# Patient Record
Sex: MALE | Race: WHITE | NOT HISPANIC OR LATINO | URBAN - METROPOLITAN AREA
[De-identification: names, ages, dates, MRNs, and addresses within clinical notes are randomized per-mention and may not be internally consistent; named-entity substitution may affect disease eponyms.]

---

## 2023-03-30 ENCOUNTER — TELEPHONE (OUTPATIENT)
Dept: CARDIOLOGY | Facility: MEDICAL CENTER | Age: 33
End: 2023-03-30

## 2023-03-30 NOTE — TELEPHONE ENCOUNTER
Chart Prep    Called patient in regards to records for NP appointment with Dr. BACON To review most recent lab results, ekg, any cardiac testing or ,if he has been treated by a cardiologist. No answer, left voicemail to call back

## 2023-04-21 ENCOUNTER — OFFICE VISIT (OUTPATIENT)
Dept: CARDIOLOGY | Facility: MEDICAL CENTER | Age: 33
End: 2023-04-21
Payer: MEDICAID

## 2023-04-21 ENCOUNTER — TELEPHONE (OUTPATIENT)
Dept: CARDIOLOGY | Facility: MEDICAL CENTER | Age: 33
End: 2023-04-21

## 2023-04-21 VITALS
RESPIRATION RATE: 16 BRPM | HEIGHT: 64 IN | SYSTOLIC BLOOD PRESSURE: 118 MMHG | DIASTOLIC BLOOD PRESSURE: 72 MMHG | WEIGHT: 190 LBS | BODY MASS INDEX: 32.44 KG/M2 | OXYGEN SATURATION: 98 % | HEART RATE: 83 BPM

## 2023-04-21 DIAGNOSIS — F15.11 METHAMPHETAMINE ABUSE IN REMISSION (HCC): ICD-10-CM

## 2023-04-21 DIAGNOSIS — F17.200 CURRENT SMOKER: ICD-10-CM

## 2023-04-21 DIAGNOSIS — R07.89 OTHER CHEST PAIN: ICD-10-CM

## 2023-04-21 DIAGNOSIS — Z71.1 CONCERN ABOUT HEART ATTACK WITHOUT DIAGNOSIS: Primary | ICD-10-CM

## 2023-04-21 PROBLEM — I25.2 OLD MI (MYOCARDIAL INFARCTION): Status: ACTIVE | Noted: 2023-04-21

## 2023-04-21 LAB — EKG IMPRESSION: NORMAL

## 2023-04-21 PROCEDURE — 99406 BEHAV CHNG SMOKING 3-10 MIN: CPT | Performed by: INTERNAL MEDICINE

## 2023-04-21 PROCEDURE — 93000 ELECTROCARDIOGRAM COMPLETE: CPT | Performed by: INTERNAL MEDICINE

## 2023-04-21 PROCEDURE — 99203 OFFICE O/P NEW LOW 30 MIN: CPT | Mod: 25 | Performed by: INTERNAL MEDICINE

## 2023-04-21 RX ORDER — ASPIRIN 81 MG/1
81 TABLET, CHEWABLE ORAL DAILY
Qty: 100 TABLET | Refills: 1 | Status: SHIPPED | OUTPATIENT
Start: 2023-04-21 | End: 2023-06-26

## 2023-04-21 RX ORDER — ASPIRIN 81 MG/1
81 TABLET, CHEWABLE ORAL DAILY
COMMUNITY
End: 2023-04-21 | Stop reason: SDUPTHER

## 2023-04-21 RX ORDER — KETOROLAC TROMETHAMINE 30 MG/ML
INJECTION, SOLUTION INTRAMUSCULAR; INTRAVENOUS
COMMUNITY
Start: 2023-03-24 | End: 2023-04-21

## 2023-04-21 RX ORDER — ATORVASTATIN CALCIUM 40 MG/1
40 TABLET, FILM COATED ORAL NIGHTLY
Qty: 100 TABLET | Refills: 3 | Status: SHIPPED | OUTPATIENT
Start: 2023-04-21 | End: 2023-06-26

## 2023-04-21 RX ORDER — CLOPIDOGREL BISULFATE 75 MG/1
75 TABLET ORAL DAILY
Qty: 90 TABLET | Refills: 1 | Status: SHIPPED | OUTPATIENT
Start: 2023-04-21 | End: 2023-06-26

## 2023-04-21 NOTE — PROGRESS NOTES
CARDIOLOGY NEW PATIENT:    PCP: No primary care provider on file.    1. Concern about heart attack without diagnosis    2. Current smoker    3. Methamphetamine abuse in remission (HCC)    4. Other chest pain        Timmy Perez here for possible recent MI.    Chief Complaint   Patient presents with    Old MI (> 6 Months)       History: Timmy Perez is a 33 y.o. male with no known medical issues, prior meth, current smoker, is here after a reported MI in Jan 2023 while at [facility] in [place] ([facility]). Advised to take atorva, Plavix and ASA which he has been taking almost daily (he occasionally forgets). He was in intermediate for 2 weeks in February. Used meth within the 3 days of his CP in January. Last used meth in jan 2023.    Had CP leading up to Jan 19th for 3 days, and then he felt weak with worsening CP, felt clammy and dizzy and flown to [facility]. Reportedly he was offered a CA/LHC on day 3 of his stay (not on arrival) but he refused since he was not sure why and he was upset from being unrested there with several blood draws.    For last 5 years noticed on an off episodes of CP. Last week he noticed recurrence of his left sided CP with his left arm tense sensation. Mostly at rest, not worse with exertion. Not associated with any other symptoms.    Has occasional THOMAS. Denies SOB, GILBERT, orthopnea, PND, palpitations, dizziness, LH.    No prior records available for my review from Jan 2023 and not in care everywhere.    No exercise routine.    Due to being in active, gained weight lately.    Never had COVID, not vaccinated against.    Social:  No alcohol use  Smoked tobacco 1ppd since high school  Prior meth abuse, quit in feb 2023  Marijuana use but not now  Lives with mother  Unemployed, used to be a   Single, no children    Cardiovascular Risk Factors:  1. Smoking status: 1 ppd  2. Type II Diabetes Mellitus: No  3. Hypertension: No  4. Dyslipidemia: Not known  5. Obesity / metabolic  "syndrome: Yes, BMI 32.6  6. Family history of ASCVD: No  7. Family history of premature ASCVD in a first degree relative (Male less than 55 years of age; Female less than 60 years of age): No  8. Sedentary lifestyle: Yes  9. Lack of exercise: Yes    Functional status:    MMRC stGstrstastdstest:st st1st 0: Breathless only during strenuous exercise  1: Short of breath when hurrying or going up a small hill  2: Walks slower than friends due to breathlessness, has to stop at own pace  3: Stops to catch breath on level ground after 100m  4: Breathless with ambulating around house or ADLs         PE:  /72 (BP Location: Left arm, Patient Position: Sitting, BP Cuff Size: Adult)   Pulse 83   Resp 16   Ht 1.626 m (5' 4\")   Wt 86.2 kg (190 lb)   SpO2 98%   BMI 32.61 kg/m²     Gen: well  HEENT: Symmetric face. Anicteric sclerae. Moist mucus membranes  NECK: No JVD.   CARDIAC: Regular, Normal S1, S2, No murmur  VASCULATURE: carotids are normal bilaterally without bruit  RESP: Clear to auscultation bilaterally   ABD: Soft, non-tender, non-distended  EXT: No edema, no clubbing or cyanosis  SKIN: Warm and dry  NEURO: No gross deficits  PSYCH: Appropriate affect, participates in conversation    The ASCVD Risk score (Hackensack DK, et al., 2019) failed to calculate.    History reviewed. No pertinent past medical history.  History reviewed. No pertinent surgical history.  Not on File  Outpatient Encounter Medications as of 4/21/2023   Medication Sig Dispense Refill    clopidogrel (PLAVIX) 75 MG Tab Take 1 Tablet by mouth every day. 90 Tablet 1    atorvastatin (LIPITOR) 40 MG Tab Take 1 Tablet by mouth every evening. 100 Tablet 3    aspirin (ASA) 81 MG Chew Tab chewable tablet Chew 1 Tablet every day. 100 Tablet 1    [DISCONTINUED] ketorolac (TORADOL) 30 MG/ML Solution       [DISCONTINUED] aspirin (ASA) 81 MG Chew Tab chewable tablet Chew 81 mg every day.       No facility-administered encounter medications on file as of 4/21/2023.     Social " History     Socioeconomic History    Marital status: Single     Spouse name: Not on file    Number of children: Not on file    Years of education: Not on file    Highest education level: Not on file   Occupational History    Not on file   Tobacco Use    Smoking status: Every Day     Packs/day: 0.50     Types: Cigarettes    Smokeless tobacco: Never   Substance and Sexual Activity    Alcohol use: Not Currently    Drug use: Not Currently    Sexual activity: Not on file   Other Topics Concern    Not on file   Social History Narrative    Not on file     Social Determinants of Health     Financial Resource Strain: Not on file   Food Insecurity: Not on file   Transportation Needs: Not on file   Physical Activity: Not on file   Stress: Not on file   Social Connections: Not on file   Intimate Partner Violence: Not on file   Housing Stability: Not on file     Family History   Problem Relation Age of Onset    Heart Disease Neg Hx          Studies    No results found for: TSHULTRASEN   No results found for: FREET4   No results found for: HBA1C  No results found for: CHOLSTRLTOT, LDL, HDL, TRIGLYCERIDE    No results found for: SODIUM, POTASSIUM, CHLORIDE, CO2, GLUCOSE, BUN, CREATININE, BUNCREATRAT, GLOMRATE  No results found for: ALKPHOSPHAT, ASTSGOT, ALTSGPT, TBILIRUBIN     Echocardiogram:  No results found for this or any previous visit.    EC23 personally reviewed and interpreted  NSR    Assessment and Recommendations:    Problem List Items Addressed This Visit       Methamphetamine abuse in remission (HCC)    Current smoker    Old MI (myocardial infarction) - Primary    Relevant Medications    clopidogrel (PLAVIX) 75 MG Tab    atorvastatin (LIPITOR) 40 MG Tab    aspirin (ASA) 81 MG Chew Tab chewable tablet     Other Visit Diagnoses       Other chest pain              Unfortunately, I do not have any records of his reported MI from 2023.  We will try to obtain records from [facility] and [facility] before  any further recommendations.  I refilled his aspirin, Plavix and atorvastatin until I reviewed his records.    It is less likely that he had an MI related to obstructive CAD given his young age, and more likely his presentation was related to methamphetamine abuse at that time.  Fortunately he is no longer using meth.  Did not seem from history, that this presentation was related to myopericarditis.    Tobacco cessation counseling and education provided for 4 minutes. Nicotine replacement options provided including patch, over the counter lozenges or gum, medications such as Wellbutrin and Chantix .  He is not ready yet to quit.  I offered him my assistance in the process once he is ready and to let me know.    Thank you for the opportunity to be involved in Timmy Perez 's care; and please reach out with any questions or concerns.    Return if symptoms worsen or fail to improve.  Follow-up will be arranged according to his record review from his January 2023 event.    Cristhian Spencer MD, MPH [facility]  Interventional Cardiologist  [facility]   of Clinical Internal Medicine - [facility]    ~ Portions of this note were completed using voice recognition software (Dragon Naturally speaking software) . Occasional transcription errors may have escaped proof reading. I have made every reasonable attempt to correct obvious errors, but I expect that there are errors of grammar and possibly content that I did not discover before finalizing the note. ~

## 2023-04-21 NOTE — TELEPHONE ENCOUNTER
Requested records from;    [facility]:  F#: [phone]  P#:[phone]    [facility]  F#: [phone]  P#: [phone]    Confirmation reports received and scanned into [employer]. Pending records.

## 2023-04-28 ENCOUNTER — OFFICE VISIT (OUTPATIENT)
Dept: URGENT CARE | Facility: PHYSICIAN GROUP | Age: 33
End: 2023-04-28
Payer: MEDICAID

## 2023-04-28 VITALS
BODY MASS INDEX: 32.61 KG/M2 | HEIGHT: 64 IN | RESPIRATION RATE: 14 BRPM | DIASTOLIC BLOOD PRESSURE: 72 MMHG | SYSTOLIC BLOOD PRESSURE: 128 MMHG | WEIGHT: 191 LBS | HEART RATE: 94 BPM | TEMPERATURE: 97.9 F | OXYGEN SATURATION: 98 %

## 2023-04-28 DIAGNOSIS — S82.302A CLOSED FRACTURE OF DISTAL END OF LEFT TIBIA, UNSPECIFIED FRACTURE MORPHOLOGY, INITIAL ENCOUNTER: ICD-10-CM

## 2023-04-28 PROCEDURE — 99213 OFFICE O/P EST LOW 20 MIN: CPT | Performed by: NURSE PRACTITIONER

## 2023-04-28 RX ORDER — IBUPROFEN 600 MG/1
TABLET ORAL
COMMUNITY
Start: 2023-04-24

## 2023-04-28 ASSESSMENT — ENCOUNTER SYMPTOMS
GASTROINTESTINAL NEGATIVE: 1
FEVER: 0
RESPIRATORY NEGATIVE: 1
CHILLS: 0
CARDIOVASCULAR NEGATIVE: 1
EYES NEGATIVE: 1
CONSTITUTIONAL NEGATIVE: 1

## 2023-04-29 NOTE — PROGRESS NOTES
"Subjective:   Timmy Perez is a 33 y.o. male who presents for Leg Injury (Pt fractured Tibia, was seen at [facility] needs referral to ortho, they would not take [facility] referral, )      Patient presents for referral to orthopedics today, status post tibia fracture sustained on 4/24/2023.  Patient reports that he was getting out of his truck and stepped wrong on the ground, and fractured his tibia.  He was seen at [facility], where x-rays were performed and he was diagnosed.  We do not have records of this, so it is unclear exactly where the fracture of his tibia is.  [facility] did refer him to orthopedics, but he tried to get in and was told he had to have a referral from [facility].  He was initially placed in a splint, but took it off because it became dirty and he wanted to shower.  He states that he can walk on it slightly with a limp but does not have full range of motion of his ankle without pain.  Patient denies significant pain.  He denies numbness and tingling.      Review of Systems   Constitutional: Negative.  Negative for chills and fever.   HENT: Negative.     Eyes: Negative.    Respiratory: Negative.     Cardiovascular: Negative.    Gastrointestinal: Negative.    Genitourinary: Negative.    Musculoskeletal:  Positive for joint pain.        Left tibia fracture   Skin: Negative.      Medications, Allergies, and current problem list reviewed today in Epic.     Objective:     /72   Pulse 94   Temp 36.6 °C (97.9 °F) (Temporal)   Resp 14   Ht 1.626 m (5' 4\")   Wt 86.6 kg (191 lb)   SpO2 98%     Physical Exam  Vitals reviewed.   Constitutional:       Appearance: Normal appearance.   HENT:      Head: Normocephalic and atraumatic.      Nose: Nose normal.      Mouth/Throat:      Mouth: Mucous membranes are moist.      Pharynx: Oropharynx is clear.   Eyes:      Extraocular Movements: Extraocular movements intact.      Conjunctiva/sclera: Conjunctivae normal.      Pupils: Pupils are equal, round, and reactive " to light.   Cardiovascular:      Rate and Rhythm: Normal rate and regular rhythm.      Pulses: Normal pulses.      Heart sounds: Normal heart sounds.   Pulmonary:      Effort: Pulmonary effort is normal.      Breath sounds: Normal breath sounds.   Abdominal:      General: Abdomen is flat. Bowel sounds are normal.      Palpations: Abdomen is soft.   Musculoskeletal:         General: Normal range of motion.      Cervical back: Normal range of motion and neck supple.      Left lower leg: Swelling, tenderness and bony tenderness present. 2+ Edema present.   Skin:     General: Skin is warm and dry.      Capillary Refill: Capillary refill takes less than 2 seconds.   Neurological:      General: No focal deficit present.      Mental Status: He is alert and oriented to person, place, and time.   Psychiatric:         Mood and Affect: Mood normal.         Behavior: Behavior normal.       Assessment/Plan:     Diagnosis and associated orders:     1. Closed fracture of distal end of left tibia, unspecified fracture morphology, initial encounter  Referral to Orthopedics         Comments/MDM:     Urgent referral to orthopedics was placed for patient today.  He plans to go to [facility] walk-in clinic in [place], and this was requested on his referral.  Patient will remain nonweightbearing until seen by specialty.  Patient will follow-up here for any further needs.         Differential diagnosis, natural history, supportive care, and indications for immediate follow-up discussed.    Advised the patient to follow-up with the primary care physician for recheck, reevaluation, and consideration of further management.    Please note that this dictation was created using voice recognition software. I have made a reasonable attempt to correct obvious errors, but I expect that there are errors of grammar and possibly content that I did not discover before finalizing the note.    This note was electronically signed by PATRIC Honeycutt

## 2023-05-31 ENCOUNTER — OFFICE VISIT (OUTPATIENT)
Dept: URGENT CARE | Facility: PHYSICIAN GROUP | Age: 33
End: 2023-05-31
Payer: MEDICAID

## 2023-05-31 VITALS
RESPIRATION RATE: 14 BRPM | BODY MASS INDEX: 35.17 KG/M2 | HEIGHT: 64 IN | DIASTOLIC BLOOD PRESSURE: 78 MMHG | TEMPERATURE: 97.5 F | HEART RATE: 84 BPM | WEIGHT: 206 LBS | OXYGEN SATURATION: 98 % | SYSTOLIC BLOOD PRESSURE: 118 MMHG

## 2023-05-31 DIAGNOSIS — L25.9 CONTACT DERMATITIS, UNSPECIFIED CONTACT DERMATITIS TYPE, UNSPECIFIED TRIGGER: ICD-10-CM

## 2023-05-31 PROCEDURE — 3074F SYST BP LT 130 MM HG: CPT | Performed by: STUDENT IN AN ORGANIZED HEALTH CARE EDUCATION/TRAINING PROGRAM

## 2023-05-31 PROCEDURE — 3078F DIAST BP <80 MM HG: CPT | Performed by: STUDENT IN AN ORGANIZED HEALTH CARE EDUCATION/TRAINING PROGRAM

## 2023-05-31 PROCEDURE — 99213 OFFICE O/P EST LOW 20 MIN: CPT | Performed by: STUDENT IN AN ORGANIZED HEALTH CARE EDUCATION/TRAINING PROGRAM

## 2023-05-31 RX ORDER — TRIAMCINOLONE ACETONIDE 0.25 MG/G
1 CREAM TOPICAL 2 TIMES DAILY
Qty: 15 G | Refills: 0 | Status: SHIPPED | OUTPATIENT
Start: 2023-05-31 | End: 2023-06-07

## 2023-05-31 RX ORDER — FAMOTIDINE 20 MG/1
TABLET, FILM COATED ORAL
COMMUNITY
Start: 2023-05-23

## 2023-05-31 RX ORDER — CETIRIZINE HYDROCHLORIDE 10 MG/1
10 TABLET ORAL DAILY
Qty: 15 TABLET | Refills: 0 | Status: SHIPPED | OUTPATIENT
Start: 2023-05-31

## 2023-05-31 ASSESSMENT — ENCOUNTER SYMPTOMS
SHORTNESS OF BREATH: 0
COUGH: 0
FATIGUE: 0
SORE THROAT: 0
FEVER: 0

## 2023-05-31 NOTE — PROGRESS NOTES
"Subjective     Timmy Perez is a 33 y.o. male who presents with Blister (X3 days ago noticed blisters all over hands, pt popped some )            Timmy is a 33 y.o. male who presents with rash on bilateral hands.  Patient states he noticed rash approximately 3 days ago. Patient states he recently started new job as a . Unsure if he came in contact with anything that caused the rash. States he borrowed someone's gloves at work and wears them sometimes. Staees rash is itchy. He did notice one blister that popped when he scratched it.    Rash  This is a new problem. Episode onset: 3 days ago. The affected locations include the right hand and left hand. The rash is characterized by redness, itchiness and blistering. It is unknown if there was an exposure to a precipitant. Pertinent negatives include no congestion, cough, fatigue, fever, shortness of breath or sore throat. Past treatments include nothing.       Review of Systems   Constitutional:  Negative for fatigue and fever.   HENT:  Negative for congestion and sore throat.    Respiratory:  Negative for cough and shortness of breath.    Skin:  Positive for rash.              Objective     /78   Pulse 84   Temp 36.4 °C (97.5 °F) (Temporal)   Resp 14   Ht 1.626 m (5' 4\")   Wt 93.4 kg (206 lb)   SpO2 98%   BMI 35.36 kg/m²      Physical Exam  Vitals reviewed.   Constitutional:       Appearance: Normal appearance.   HENT:      Head: Normocephalic and atraumatic.   Eyes:      Extraocular Movements: Extraocular movements intact.      Conjunctiva/sclera: Conjunctivae normal.      Pupils: Pupils are equal, round, and reactive to light.   Cardiovascular:      Rate and Rhythm: Normal rate.   Pulmonary:      Effort: Pulmonary effort is normal.   Musculoskeletal:         General: Normal range of motion.      Cervical back: Normal range of motion.   Skin:     General: Skin is warm and dry.      Capillary Refill: Capillary refill takes less than 2 " seconds.      Findings: Rash present. Rash is macular, papular and urticarial.          Neurological:      General: No focal deficit present.      Mental Status: He is alert. Mental status is at baseline.                             Assessment & Plan        1. Contact dermatitis, unspecified contact dermatitis type, unspecified trigger  - cetirizine (ZYRTEC ALLERGY) 10 MG Tab; Take 1 Tablet by mouth every day.  Dispense: 15 Tablet; Refill: 0  - triamcinolone acetonide (KENALOG) 0.025 % Cream; Apply 1 Application. topically 2 times a day for 7 days.  Dispense: 15 g; Refill: 0     Differential diagnoses, supportive care, and indications for immediate follow-up discussed with patient. Pathogenesis of diagnosis discussed including typical length and natural progression.  See AVS.    Instructed to return to urgent care or nearest emergency department if symptoms fail to improve, for any change in condition, further concerns, or new concerning symptoms.    Patient states understanding and agrees with the plan of care and discharge instructions.

## 2023-06-26 ENCOUNTER — TELEPHONE (OUTPATIENT)
Dept: CARDIOLOGY | Facility: MEDICAL CENTER | Age: 33
End: 2023-06-26
Payer: MEDICAID

## 2023-06-26 NOTE — TELEPHONE ENCOUNTER
I reviewed the records from [facility] and [facility] under [facility]. In 1/2023 he presented with CP, no cath done (patient refused), echo normal, elevated troponin; attributed to coronary spasm at that time probably meth induced.    I was unable to get a hold of him over the phone, hence I called his mom and advised her to let him know to stop: aspirin, Plavix and atorvastatin, and to call us at anytime with any questions or concerns.  Medication discontinued.    She was appreciative of the call.

## 2024-04-02 ENCOUNTER — OFFICE VISIT (OUTPATIENT)
Dept: URGENT CARE | Facility: PHYSICIAN GROUP | Age: 34
End: 2024-04-02
Payer: MEDICAID

## 2024-04-02 VITALS
OXYGEN SATURATION: 96 % | SYSTOLIC BLOOD PRESSURE: 118 MMHG | TEMPERATURE: 98.1 F | WEIGHT: 234 LBS | HEIGHT: 64 IN | HEART RATE: 85 BPM | RESPIRATION RATE: 16 BRPM | BODY MASS INDEX: 39.95 KG/M2 | DIASTOLIC BLOOD PRESSURE: 80 MMHG

## 2024-04-02 DIAGNOSIS — K04.7 DENTAL INFECTION: ICD-10-CM

## 2024-04-02 DIAGNOSIS — K05.6 PERIODONTAL DISEASE: ICD-10-CM

## 2024-04-02 DIAGNOSIS — F17.210 TOBACCO DEPENDENCE DUE TO CIGARETTES: ICD-10-CM

## 2024-04-02 PROCEDURE — 99213 OFFICE O/P EST LOW 20 MIN: CPT | Performed by: NURSE PRACTITIONER

## 2024-04-02 PROCEDURE — 3074F SYST BP LT 130 MM HG: CPT | Performed by: NURSE PRACTITIONER

## 2024-04-02 PROCEDURE — 1125F AMNT PAIN NOTED PAIN PRSNT: CPT | Performed by: NURSE PRACTITIONER

## 2024-04-02 PROCEDURE — 3079F DIAST BP 80-89 MM HG: CPT | Performed by: NURSE PRACTITIONER

## 2024-04-02 RX ORDER — CHLORHEXIDINE GLUCONATE ORAL RINSE 1.2 MG/ML
15 SOLUTION DENTAL 2 TIMES DAILY
Qty: 118 ML | Refills: 0 | Status: SHIPPED | OUTPATIENT
Start: 2024-04-02

## 2024-04-02 RX ORDER — IBUPROFEN 600 MG/1
TABLET ORAL
Qty: 60 TABLET | Refills: 0 | Status: SHIPPED | OUTPATIENT
Start: 2024-04-02

## 2024-04-02 RX ORDER — AMOXICILLIN AND CLAVULANATE POTASSIUM 875; 125 MG/1; MG/1
1 TABLET, FILM COATED ORAL 2 TIMES DAILY
Qty: 14 TABLET | Refills: 0 | Status: SHIPPED | OUTPATIENT
Start: 2024-04-02 | End: 2024-04-09

## 2024-04-02 ASSESSMENT — PAIN SCALES - GENERAL: PAINLEVEL: 10=SEVERE PAIN

## 2024-04-02 NOTE — PROGRESS NOTES
"Subjective:   Timmy Perez is a 34 y.o. male who presents for Dental Pain (Two days dental pain. Left top teeth)      Patient is a 32-year-old male presents clinic today reporting 2-day history of worsening upper left dental pain secondary to worn down and broken teeth.  Patient ports long history of severe.  Dental disease with multiple decaying and broken teeth.  Patient does currently smoke cigarettes.  He has not had any fevers, chills, discharge or drainage from tooth, headaches, or dizziness.        Medications, Allergies, and current problem list reviewed today in Epic.     Objective:     /80   Pulse 85   Temp 36.7 °C (98.1 °F) (Temporal)   Resp 16   Ht 1.626 m (5' 4\")   Wt 106 kg (234 lb)   SpO2 96%     Physical Exam  Vitals reviewed.   Constitutional:       General: He is not in acute distress.     Appearance: Normal appearance. He is not ill-appearing or toxic-appearing.   HENT:      Head: Normocephalic.      Nose: Nose normal.      Mouth/Throat:      Lips: Pink. No lesions.      Mouth: Mucous membranes are moist.      Dentition: Abnormal dentition. Dental tenderness, gingival swelling and dental caries present. No dental abscesses or gum lesions.      Comments: Peridental disease throughout oral cavity.  Severe dental decay and multiple broken and decayed teeth throughout.  Negative for signs of dental abscess.  Eyes:      Extraocular Movements: Extraocular movements intact.      Conjunctiva/sclera: Conjunctivae normal.      Pupils: Pupils are equal, round, and reactive to light.   Cardiovascular:      Rate and Rhythm: Normal rate and regular rhythm.   Pulmonary:      Effort: Pulmonary effort is normal.   Musculoskeletal:         General: Normal range of motion.      Cervical back: Normal range of motion and neck supple.   Lymphadenopathy:      Cervical: No cervical adenopathy.   Skin:     General: Skin is warm and dry.   Neurological:      General: No focal deficit present.      Mental " Status: He is alert and oriented to person, place, and time.   Psychiatric:         Mood and Affect: Mood normal.         Behavior: Behavior normal.         Assessment/Plan:     Diagnosis and associated orders:     1. Dental infection  ibuprofen (MOTRIN) 600 MG Tab    amoxicillin-clavulanate (AUGMENTIN) 875-125 MG Tab    chlorhexidine (PERIDEX) 0.12 % Solution      2. Periodontal disease        3. Tobacco dependence due to cigarettes           Comments/MDM:     This acute condition secondary to chronic peritonsillar disease.  Patient was started on Augmentin, ibuprofen, and chlorhexidine mouthwash.  Side effects medication were discussed at length.  Discussed with patient to not take any other NSAIDs while taking ibuprofen and always take it with food.  May take additional Tylenol if needed.  Ice  Drink plenty of fluids  Follow-up with dentist as soon as possible  Return to clinic or go to the ED if symptoms worsen or fail to improve, or if the patient should develop worsening/increasing dental pain, facial swelling, redness or warmth to the affected area, difficulty swallowing, shortness of breath, fever/chills, and/or any concerning symptoms.    Patient was involved with shared decision-making throughout the exam today and verbalizes understanding regards to plan of care, discharge instructions, and follow-up         Differential diagnosis, natural history, supportive care, and indications for immediate follow-up discussed.    Advised the patient to follow-up with the primary care physician for recheck, reevaluation, and consideration of further management.    I personally reviewed prior external notes and test results pertinent to today's visit as well as additional imaging and testing completed in clinic today.     Please note that this dictation was created using voice recognition software. I have made a reasonable attempt to correct obvious errors, but I expect that there are errors of grammar and possibly  content that I did not discover before finalizing the note.

## 2024-05-09 ENCOUNTER — OFFICE VISIT (OUTPATIENT)
Dept: INTERNAL MEDICINE | Facility: OTHER | Age: 34
End: 2024-05-09
Payer: MEDICAID

## 2024-05-09 VITALS
HEART RATE: 81 BPM | WEIGHT: 229 LBS | OXYGEN SATURATION: 97 % | SYSTOLIC BLOOD PRESSURE: 117 MMHG | TEMPERATURE: 97.4 F | HEIGHT: 64 IN | BODY MASS INDEX: 39.09 KG/M2 | DIASTOLIC BLOOD PRESSURE: 77 MMHG

## 2024-05-09 DIAGNOSIS — I25.2 HISTORY OF NON-ST ELEVATION MYOCARDIAL INFARCTION (NSTEMI): ICD-10-CM

## 2024-05-09 DIAGNOSIS — Z11.59 NEED FOR HEPATITIS C SCREENING TEST: ICD-10-CM

## 2024-05-09 DIAGNOSIS — F90.9 ATTENTION DEFICIT HYPERACTIVITY DISORDER (ADHD), UNSPECIFIED ADHD TYPE: ICD-10-CM

## 2024-05-09 DIAGNOSIS — K21.9 GASTROESOPHAGEAL REFLUX DISEASE, UNSPECIFIED WHETHER ESOPHAGITIS PRESENT: ICD-10-CM

## 2024-05-09 DIAGNOSIS — E66.9 OBESITY (BMI 35.0-39.9 WITHOUT COMORBIDITY): ICD-10-CM

## 2024-05-09 DIAGNOSIS — F15.91 HISTORY OF METHAMPHETAMINE USE: ICD-10-CM

## 2024-05-09 DIAGNOSIS — Z23 NEED FOR VACCINATION: ICD-10-CM

## 2024-05-09 DIAGNOSIS — F41.1 GAD (GENERALIZED ANXIETY DISORDER): ICD-10-CM

## 2024-05-09 DIAGNOSIS — Z11.4 SCREENING FOR HIV (HUMAN IMMUNODEFICIENCY VIRUS): ICD-10-CM

## 2024-05-09 DIAGNOSIS — Z72.0 TOBACCO USE: ICD-10-CM

## 2024-05-09 PROCEDURE — 99204 OFFICE O/P NEW MOD 45 MIN: CPT | Mod: 25 | Performed by: INTERNAL MEDICINE

## 2024-05-09 PROCEDURE — 90677 PCV20 VACCINE IM: CPT | Performed by: INTERNAL MEDICINE

## 2024-05-09 PROCEDURE — 90471 IMMUNIZATION ADMIN: CPT | Performed by: INTERNAL MEDICINE

## 2024-05-09 RX ORDER — PANTOPRAZOLE SODIUM 40 MG/1
40 TABLET, DELAYED RELEASE ORAL 2 TIMES DAILY
Qty: 60 TABLET | Refills: 2 | Status: SHIPPED | OUTPATIENT
Start: 2024-05-09

## 2024-05-09 RX ORDER — NICOTINE 21 MG/24HR
1 PATCH, TRANSDERMAL 24 HOURS TRANSDERMAL EVERY 24 HOURS
Qty: 21 PATCH | Refills: 1 | Status: SHIPPED | OUTPATIENT
Start: 2024-05-09

## 2024-05-09 RX ORDER — PANTOPRAZOLE SODIUM 40 MG/1
40 TABLET, DELAYED RELEASE ORAL DAILY
COMMUNITY
End: 2024-05-09 | Stop reason: SDUPTHER

## 2024-05-09 RX ORDER — BUSPIRONE HYDROCHLORIDE 10 MG/1
10 TABLET ORAL 2 TIMES DAILY
Qty: 60 TABLET | Refills: 2 | Status: SHIPPED | OUTPATIENT
Start: 2024-05-09

## 2024-05-09 RX ORDER — SUCRALFATE 1 G/1
1 TABLET ORAL
Qty: 90 TABLET | Refills: 2 | Status: SHIPPED | OUTPATIENT
Start: 2024-05-09

## 2024-05-09 ASSESSMENT — ANXIETY QUESTIONNAIRES
3. WORRYING TOO MUCH ABOUT DIFFERENT THINGS: NEARLY EVERY DAY
4. TROUBLE RELAXING: NOT AT ALL
GAD7 TOTAL SCORE: 15
5. BEING SO RESTLESS THAT IT IS HARD TO SIT STILL: NEARLY EVERY DAY
IF YOU CHECKED OFF ANY PROBLEMS ON THIS QUESTIONNAIRE, HOW DIFFICULT HAVE THESE PROBLEMS MADE IT FOR YOU TO DO YOUR WORK, TAKE CARE OF THINGS AT HOME, OR GET ALONG WITH OTHER PEOPLE: VERY DIFFICULT
6. BECOMING EASILY ANNOYED OR IRRITABLE: NEARLY EVERY DAY
7. FEELING AFRAID AS IF SOMETHING AWFUL MIGHT HAPPEN: NOT AT ALL
1. FEELING NERVOUS, ANXIOUS, OR ON EDGE: NEARLY EVERY DAY
2. NOT BEING ABLE TO STOP OR CONTROL WORRYING: NEARLY EVERY DAY

## 2024-05-09 ASSESSMENT — PATIENT HEALTH QUESTIONNAIRE - PHQ9: CLINICAL INTERPRETATION OF PHQ2 SCORE: 0

## 2024-05-09 NOTE — LETTER
May 9, 2024    To Whom It May Concern:         This is confirmation that Timmy Perez attended his scheduled appointment with Maday Cooper D.O. on 5/09/24.         If you have any questions please do not hesitate to call me at the phone number listed below.    Sincerely,          Maday Cooper D.O.  [phone]

## 2024-05-09 NOTE — PROGRESS NOTES
"CC:  New patient and GERD    HISTORY OF THE PRESENT ILLNESS: Patient is a 34 y.o. male who present as new patient. Patient reports a history of GERD, ADHD and anxiety, NSTEMI (2023) believe to be secondary to drug use.   He reports that he has been in [facility] for over a year, has been sober for 14 months.  He was receiving medications through another provider.    GERD  He reports that symptoms started over a year ago and has progressively worsen. States he has heartburn with all foods and liquids. Sensation is raw feeling. Has nausea and vomiting (morning). He denies abdominal pain, difficult swallowing, food getting suck/choking bloody or melanotic stools. He believes that his symptoms worsen after he gained weight after stopping drugs. He diet is poor, generally eats junk, fast food and take-out.  Acidic food  makes symptoms worse, he had to stop drinking coffee. Redbull energy drinks seems to help with heartburn.  TUMS does not help  He was started Pantoprazole about 1 month ago which helps with symptoms, however, pain recurs later on in the day.     ADHD  Diagnosed in childhood, he was Adderall starting in [employer] high. Stopped after high school after getting involved with drugs (methamphetamines and marijuana). He was the placed on Adderall for short time in adulthood by a provider in [place], but was lost to follow-up after getting involved in drugs again. He has been in [facility] [place] and sober for the past 14 months, states that Dr. Méndez placed him on Wellbutrin for ADHD, which did not help. Although it did help with smoking cessation.   He is wanted to get back on medications.     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 Prior to that he would medications of the streets.  He states that anxiety is uncontrolled, he is not currently on medications. He will not try any antidepressant as he has seen \"what it has done to his mother\"    Tobacco use  Has been smoking since 7th grade " (12-14 yo), 1 pack per day.  He was on Wellbutrin for ADHD, which help with smoking, but does not want to get back on medication.  He is open to quitting.      Health Maintenance:     Screening/Preventative Topics:  Cholesterol Screening: NA  Diet: Not balanced, he reports he still eat junk/fast food.  Exercise: 3 times weekly, weight-lifting  Screen for depression: 0  Substance Use: H/o methamphetamine use (IVDU once), smokes marijuana. No alcohol use    Immunizations:   Tetanus: overdue, patient declines-states it hurts too much  Pneumococcal: given today    Allergies: Patient has no known allergies.    Current Outpatient Medications Ordered in Epic   Medication Sig Dispense Refill    nicotine (NICODERM) 21 MG/24HR PATCH 24 HR Place 1 Patch on the skin every 24 hours. 21 Patch 1    pantoprazole (PROTONIX) 40 MG Tablet Delayed Response Take 1 Tablet by mouth 2 times a day. 60 Tablet 2    sucralfate (CARAFATE) 1 GM Tab Take 1 Tablet by mouth 3 times a day before meals. 90 Tablet 2    busPIRone (BUSPAR) 10 MG Tab tablet Take 1 Tablet by mouth 2 times a day. 60 Tablet 2    ibuprofen (MOTRIN) 600 MG Tab 1 TAB BY MOUTH EVERY 6 HOURS ONLY IF NEEDED FOR PAIN AND INFLAMMATION. TAKE WITH FOOD. 60 Tablet 0     No current Epic-ordered facility-administered medications on file.       Past Medical History:   Diagnosis Date    Heart attack (HCC)        History reviewed. No pertinent surgical history.    Social History     Tobacco Use    Smoking status: Every Day     Current packs/day: 0.50     Types: Cigarettes    Smokeless tobacco: Never   Substance Use Topics    Alcohol use: Not Currently    Drug use: Not Currently       Social History     Social History Narrative    Not on file       Family History   Problem Relation Age of Onset    Heart Disease Neg Hx        ROS:   Review of Systems   Constitutional:  Negative for chills, fever, malaise/fatigue and weight loss.   HENT:  Negative for congestion and sore throat.    Eyes:   "Negative for blurred vision and double vision.   Respiratory:  Negative for cough and shortness of breath.    Cardiovascular:  Negative for chest pain, palpitations and leg swelling.   Gastrointestinal:  Positive for heartburn, nausea and vomiting. Negative for abdominal pain, blood in stool, constipation, diarrhea and melena.   Genitourinary:  Negative for frequency and urgency.   Musculoskeletal:  Negative for myalgias.   Skin:  Negative for itching and rash.   Neurological:  Negative for dizziness, weakness and headaches.   Psychiatric/Behavioral:  Negative for depression. The patient is nervous/anxious.        Exam: /77 (BP Location: Left arm, Patient Position: Sitting, BP Cuff Size: Adult)   Pulse 81   Temp 36.3 °C (97.4 °F) (Temporal)   Ht 1.626 m (5' 4\")   Wt 104 kg (229 lb)   SpO2 97%  Body mass index is 39.31 kg/m².  Physical Exam  Constitutional:       General: He is not in acute distress.     Appearance: He is obese.   HENT:      Head: Normocephalic and atraumatic.      Right Ear: Tympanic membrane and external ear normal. There is no impacted cerumen.      Left Ear: Tympanic membrane and external ear normal. There is no impacted cerumen.      Mouth/Throat:      Mouth: Mucous membranes are moist.      Pharynx: Oropharynx is clear.   Eyes:      Extraocular Movements: Extraocular movements intact.      Conjunctiva/sclera: Conjunctivae normal.   Cardiovascular:      Rate and Rhythm: Normal rate and regular rhythm.      Pulses: Normal pulses.   Pulmonary:      Effort: Pulmonary effort is normal.      Breath sounds: Normal breath sounds.   Abdominal:      General: Bowel sounds are normal. There is no distension.      Palpations: Abdomen is soft.      Tenderness: There is no abdominal tenderness.   Musculoskeletal:      Cervical back: Neck supple.      Right lower leg: No edema.      Left lower leg: No edema.   Lymphadenopathy:      Cervical: No cervical adenopathy.   Skin:     General: Skin is warm " and dry.   Neurological:      General: No focal deficit present.      Mental Status: He is alert.   Psychiatric:         Mood and Affect: Mood normal.         Behavior: Behavior normal.         Thought Content: Thought content normal.         Judgment: Judgment normal.         Assessment/Plan    Gastroesophageal reflux disease, unspecified whether esophagitis present  Onset over 1 year ago   We discussed H. Pylori testing, however, he is not amenable to coming off PPI. Has tried H2 block in the past without improvement.  Will increase Pantoprazole to BID and add Carafate  Will refer to GI as patient reports symptoms will both liquids/solids, he would like to have endoscopy  - CBC WITHOUT DIFFERENTIAL; Future  - pantoprazole (PROTONIX) 40 MG Tablet Delayed Response; Take 1 Tablet by mouth 2 times a day.  Dispense: 60 Tablet; Refill: 2  - sucralfate (CARAFATE) 1 GM Tab; Take 1 Tablet by mouth 3 times a day before meals.  Dispense: 90 Tablet; Refill: 2  - Referral to Gastroenterology    History of non-ST elevation myocardial infarction (NSTEMI)  NSTEMI in 2023. Had ECHO with LVEF 55% no regional wall abnormalities. Was recommend [facility], however, patient had declined.  He was discharge home on ASA, Lipitor and Plavix. Saw Cardiology outpatient, who believe it was related to coronary spasm/meth use.  All medications discontinued  Denies any frequent recurrence pain    Attention deficit hyperactivity disorder (ADHD), unspecified ADHD type  Diagnosed in childhood, was on Adderall. Stopped after getting involved in drug use.  Has tried Wellbutrin without effect. Also, reports he was tried on a non-stimulant which was ineffective  Will refer to Psych for evaluation  - Referral to Psychiatry  - Referral to Psychology    MADELEINE (generalized anxiety disorder)  Diagnosed 5 years ago, reports he was on Xanax while in residential  We discussed treatment options to include SSRI/SNRI, patient declined as he has seen the effects on his  mother.  He is open to trying Buspar, will start 10mg BID  - Referral to Psychiatry  - Referral to Psychology  - busPIRone (BUSPAR) 10 MG Tab tablet; Take 1 Tablet by mouth 2 times a day.  Dispense: 60 Tablet; Refill: 2    Tobacco use  Tobacco cessation counseling and education provided for 7 minutes. Nicotine replacement options provided including patch, and further medical treatments including Wellbutrin and Chantix. As well as over the counter options of lozenges and gum.  He had success with Wellbutrin, but does not want to try it again.   Opened to nicotine patches, prescription provided.  - nicotine (NICODERM) 21 MG/24HR PATCH 24 HR; Place 1 Patch on the skin every 24 hours.  Dispense: 21 Patch; Refill: 1  - Lipid Profile; Future    History of methamphetamine use  Sober for 14 months, just completed [facility]  Congratulation and encouraged continuation abstinence    Obesity (BMI 35.0-39.9 without comorbidity)  Patient meets criteria for having weight management issue based on their BMI; appropriate education counseling were provided please see below for some details:  Encouraged diet high in fruits, vegetables, and fiber. And a diet low in salt and processed foods because of their cholesterol, saturated fat, and trans fatty acids content.    Saturated fats are also found in creams, cheeses, butter, mayonnaise, and fried foods.  Encouraged a minimum of 15 minutes of moderate intensity aerobic exercise (eg, brisk walking) is recommended on five days each week. Or 20 minutes of vigorous-intensity aerobic exercise (eg, jogging) on three days each week.   Patient declined nutrition referral  - HEMOGLOBIN A1C; Future  - Lipid Profile; Future  - Comp Metabolic Panel; Future  - TSH WITH REFLEX TO FT4; Future    Screening for HIV (human immunodeficiency virus)  - HIV AG/AB COMBO ASSAY SCREENING; Future    Need for hepatitis C screening test  - HEP C VIRUS ANTIBODY; Future    Need for vaccination  - Pneumococcal  Conjugate Vaccine 20-Valent (6 wks+)    Follow-up in 4 weeks.

## 2024-05-10 ASSESSMENT — ENCOUNTER SYMPTOMS
VOMITING: 1
HEARTBURN: 1
WEIGHT LOSS: 0
DIZZINESS: 0
CONSTIPATION: 0
NAUSEA: 1
CHILLS: 0
DEPRESSION: 0
SHORTNESS OF BREATH: 0
DIARRHEA: 0
MYALGIAS: 0
DOUBLE VISION: 0
SORE THROAT: 0
COUGH: 0
WEAKNESS: 0
ABDOMINAL PAIN: 0
NERVOUS/ANXIOUS: 1
HEADACHES: 0
FEVER: 0
PALPITATIONS: 0
BLURRED VISION: 0
BLOOD IN STOOL: 0

## 2024-05-13 ENCOUNTER — HOSPITAL ENCOUNTER (OUTPATIENT)
Dept: LAB | Facility: MEDICAL CENTER | Age: 34
End: 2024-05-13
Attending: INTERNAL MEDICINE
Payer: MEDICAID

## 2024-05-13 DIAGNOSIS — Z72.0 TOBACCO USE: ICD-10-CM

## 2024-05-13 DIAGNOSIS — E66.9 OBESITY (BMI 35.0-39.9 WITHOUT COMORBIDITY): ICD-10-CM

## 2024-05-13 DIAGNOSIS — Z11.4 SCREENING FOR HIV (HUMAN IMMUNODEFICIENCY VIRUS): ICD-10-CM

## 2024-05-13 DIAGNOSIS — Z11.59 NEED FOR HEPATITIS C SCREENING TEST: ICD-10-CM

## 2024-05-13 DIAGNOSIS — K21.9 GASTROESOPHAGEAL REFLUX DISEASE, UNSPECIFIED WHETHER ESOPHAGITIS PRESENT: ICD-10-CM

## 2024-05-13 LAB
ALBUMIN SERPL BCP-MCNC: 4.7 G/DL (ref 3.2–4.9)
ALBUMIN/GLOB SERPL: 1.7 G/DL
ALP SERPL-CCNC: 85 U/L (ref 30–99)
ALT SERPL-CCNC: 40 U/L (ref 2–50)
ANION GAP SERPL CALC-SCNC: 15 MMOL/L (ref 7–16)
AST SERPL-CCNC: 29 U/L (ref 12–45)
BILIRUB SERPL-MCNC: 0.4 MG/DL (ref 0.1–1.5)
BUN SERPL-MCNC: 11 MG/DL (ref 8–22)
CALCIUM ALBUM COR SERPL-MCNC: 9 MG/DL (ref 8.5–10.5)
CALCIUM SERPL-MCNC: 9.6 MG/DL (ref 8.5–10.5)
CHLORIDE SERPL-SCNC: 104 MMOL/L (ref 96–112)
CHOLEST SERPL-MCNC: 100 MG/DL (ref 100–199)
CO2 SERPL-SCNC: 21 MMOL/L (ref 20–33)
CREAT SERPL-MCNC: 1.03 MG/DL (ref 0.5–1.4)
ERYTHROCYTE [DISTWIDTH] IN BLOOD BY AUTOMATED COUNT: 37.6 FL (ref 35.9–50)
EST. AVERAGE GLUCOSE BLD GHB EST-MCNC: 103 MG/DL
GFR SERPLBLD CREATININE-BSD FMLA CKD-EPI: 99 ML/MIN/1.73 M 2
GLOBULIN SER CALC-MCNC: 2.7 G/DL (ref 1.9–3.5)
GLUCOSE SERPL-MCNC: 100 MG/DL (ref 65–99)
HBA1C MFR BLD: 5.2 % (ref 4–5.6)
HCT VFR BLD AUTO: 45.9 % (ref 42–52)
HCV AB SER QL: NORMAL
HDLC SERPL-MCNC: 34 MG/DL
HGB BLD-MCNC: 16.7 G/DL (ref 14–18)
HIV 1+2 AB+HIV1 P24 AG SERPL QL IA: NORMAL
LDLC SERPL CALC-MCNC: 54 MG/DL
MCH RBC QN AUTO: 31 PG (ref 27–33)
MCHC RBC AUTO-ENTMCNC: 36.4 G/DL (ref 32.3–36.5)
MCV RBC AUTO: 85.3 FL (ref 81.4–97.8)
PLATELET # BLD AUTO: 281 K/UL (ref 164–446)
PMV BLD AUTO: 11 FL (ref 9–12.9)
POTASSIUM SERPL-SCNC: 3.7 MMOL/L (ref 3.6–5.5)
PROT SERPL-MCNC: 7.4 G/DL (ref 6–8.2)
RBC # BLD AUTO: 5.38 M/UL (ref 4.7–6.1)
SODIUM SERPL-SCNC: 140 MMOL/L (ref 135–145)
TRIGL SERPL-MCNC: 61 MG/DL (ref 0–149)
TSH SERPL DL<=0.005 MIU/L-ACNC: 2.3 UIU/ML (ref 0.38–5.33)
WBC # BLD AUTO: 6.9 K/UL (ref 4.8–10.8)

## 2024-12-31 ENCOUNTER — OFFICE VISIT (OUTPATIENT)
Dept: INTERNAL MEDICINE | Facility: OTHER | Age: 34
End: 2024-12-31
Payer: MEDICAID

## 2024-12-31 VITALS
WEIGHT: 194 LBS | HEART RATE: 88 BPM | BODY MASS INDEX: 33.12 KG/M2 | HEIGHT: 64 IN | TEMPERATURE: 98.8 F | DIASTOLIC BLOOD PRESSURE: 77 MMHG | SYSTOLIC BLOOD PRESSURE: 115 MMHG | OXYGEN SATURATION: 99 %

## 2024-12-31 DIAGNOSIS — K21.9 GASTROESOPHAGEAL REFLUX DISEASE, UNSPECIFIED WHETHER ESOPHAGITIS PRESENT: ICD-10-CM

## 2024-12-31 PROCEDURE — 99213 OFFICE O/P EST LOW 20 MIN: CPT | Mod: GE

## 2024-12-31 PROCEDURE — 3074F SYST BP LT 130 MM HG: CPT | Mod: GC

## 2024-12-31 PROCEDURE — 3078F DIAST BP <80 MM HG: CPT | Mod: GC

## 2024-12-31 RX ORDER — SUCRALFATE 1 G/1
1 TABLET ORAL
Qty: 90 TABLET | Refills: 0 | Status: SHIPPED | OUTPATIENT
Start: 2024-12-31

## 2024-12-31 RX ORDER — PANTOPRAZOLE SODIUM 40 MG/1
40 TABLET, DELAYED RELEASE ORAL 2 TIMES DAILY
Qty: 60 TABLET | Refills: 1 | Status: SHIPPED | OUTPATIENT
Start: 2024-12-31 | End: 2025-01-03 | Stop reason: SDUPTHER

## 2024-12-31 ASSESSMENT — FIBROSIS 4 INDEX: FIB4 SCORE: 0.52

## 2024-12-31 NOTE — PATIENT INSTRUCTIONS
psychiatry  [facility]  [address]  [place] [address]  Phone: [phone]    GI:   [facility]  [address]  [place] [address]  Phone: [phone]

## 2025-01-01 ASSESSMENT — ENCOUNTER SYMPTOMS
BLOOD IN STOOL: 0
HEADACHES: 0
DOUBLE VISION: 0
NERVOUS/ANXIOUS: 1
DIARRHEA: 0
SORE THROAT: 0
BLURRED VISION: 0
DIZZINESS: 0
VOMITING: 1
SHORTNESS OF BREATH: 0
WEAKNESS: 0
CHILLS: 0
NAUSEA: 1
DEPRESSION: 0
PALPITATIONS: 0
HEARTBURN: 0
ABDOMINAL PAIN: 0
MYALGIAS: 0
CONSTIPATION: 0
WEIGHT LOSS: 0
FEVER: 0
COUGH: 0

## 2025-01-01 NOTE — PROGRESS NOTES
Chief Complaint   Patient presents with   • Paperwork   • Medication Refill     pantoprazole       HISTORY OF PRESENT ILLNESS: Patient is a 34 y.o. male established patient with past medical history of GERD, ADHD and anxiety, NSTEMI (2023) believe to be secondary to drug use.  She is a patient of Dr. Vernon.   Patient presents today requesting paperwork for his unemployment.  The following were discussed as below:     During patient's last visit on 5/9/2024, he was referred to gastroenterology due to severe GERD with associated severe nausea and vomiting.  His symptoms did not improve with H2 blocker, he was on PPI which was subsequently increased to pantoprazole twice daily and sucralfate added.  Patient states that he missed his appointment with gastroenterology since he was incarcerated for DIU.  Patient states that he was laid off work due to his persistent nausea and vomiting at work.      Patient with known history of using meth however states that he has been sober for a few months now since completing the program.           Past Medical History:   Diagnosis Date   • Heart attack ([facility])        Patient Active Problem List    Diagnosis Date Noted   • Methamphetamine abuse in remission ([facility]) 04/21/2023   • Current smoker 04/21/2023   • Old MI (myocardial infarction) 04/21/2023       Patient has no known allergies.    Current Outpatient Medications   Medication Sig Dispense Refill   • pantoprazole (PROTONIX) 40 MG Tablet Delayed Response Take 1 Tablet by mouth 2 times a day. 60 Tablet 1   • sucralfate (CARAFATE) 1 GM Tab Take 1 Tablet by mouth 3 times a day before meals. 90 Tablet 0   • nicotine (NICODERM) 21 MG/24HR PATCH 24 HR Place 1 Patch on the skin every 24 hours. (Patient not taking: Reported on 12/31/2024) 21 Patch 1     No current facility-administered medications for this visit.       Social History     Tobacco Use   • Smoking status: Every Day     Current packs/day: 0.50     Types: Cigarettes   •  "Smokeless tobacco: Never   Substance Use Topics   • Alcohol use: Not Currently   • Drug use: Not Currently       Family History   Problem Relation Age of Onset   • Heart Disease Neg Hx          Review of Systems   ROS    Exam:  /77 (BP Location: Right arm, Patient Position: Sitting, BP Cuff Size: Adult)   Pulse 88   Temp 37.1 °C (98.8 °F) (Temporal)   Ht 1.626 m (5' 4\")   Wt 88 kg (194 lb)   SpO2 99%  Body mass index is 33.3 kg/m².    ***Constitutional:  Not in acute distress, well appearing.  HEENT:   NC/AT  Cardiovascular: Regular rate and rhythm. No murmurs or gallops.      Lungs:   Clear to auscultation bilaterally. No wheezes or crackles. No respiratory distress.  Abdomen: Not distended, soft, not tender. No guarding or rigidity. No masses.  Extremities:  No cyanosis/clubbing/edema. No obvious deformities.  Skin:  Warm and dry.  No visible rashes.  Neurologic: Alert & oriented x 3, CN II-XII grossly intact, strength and sensation grossly intact.  No focal deficits noted.  Psychiatric:  Affect normal, mood normal, judgment normal.    Assessment/Plan:     1. Gastroesophageal reflux disease, unspecified whether esophagitis present  ***  - pantoprazole (PROTONIX) 40 MG Tablet Delayed Response; Take 1 Tablet by mouth 2 times a day.  Dispense: 60 Tablet; Refill: 1  - sucralfate (CARAFATE) 1 GM Tab; Take 1 Tablet by mouth 3 times a day before meals.  Dispense: 90 Tablet; Refill: 0      All imaging results and lab results and consult notes are reviewed at this visit.  Followup: Return in about 4 weeks (around 1/28/2025).    Please note that this dictation was created using voice recognition software. I have made every reasonable attempt to correct obvious errors, but I expect that there are errors of grammar and possibly content that I did not discover before finalizing the note.    YAMEL HOUSTON MD  PGY-3  " strength and sensation grossly intact.  No focal deficits noted.  Psychiatric:  Affect normal, mood normal, judgment normal.    Assessment/Plan:     1. Gastroesophageal reflux disease, unspecified whether esophagitis present  -Behavioral modifications discussed  - H pylori testing discussed however still not amenable to stopping PPI for a period of time. Encouraged to make urgent appointment with GI for endoscopy and further recommendations  -continue current management with pantoprazole and sucralfate  - pantoprazole (PROTONIX) 40 MG Tablet Delayed Response; Take 1 Tablet by mouth 2 times a day.  Dispense: 60 Tablet; Refill: 1  - sucralfate (CARAFATE) 1 GM Tab; Take 1 Tablet by mouth 3 times a day before meals.  Dispense: 90 Tablet; Refill: 0    2. Unemployment paper work  -Patient requests paperwork for unemployment as he states that his medical condition(severe GERD) interfered with his work and was subsequently was lay off at work.Please refer to media section for reference.  Currently undergoing workup and management. He is also to follow up with PCP     All imaging results and lab results and consult notes are reviewed at this visit.  Followup: Return in about 4 weeks (around 1/28/2025).    Please note that this dictation was created using voice recognition software. I have made every reasonable attempt to correct obvious errors, but I expect that there are errors of grammar and possibly content that I did not discover before finalizing the note.    YAMEL HOUSTON MD  PGY-3

## 2025-01-03 ENCOUNTER — TELEPHONE (OUTPATIENT)
Dept: INTERNAL MEDICINE | Facility: OTHER | Age: 35
End: 2025-01-03
Payer: MEDICAID

## 2025-01-03 RX ORDER — PANTOPRAZOLE SODIUM 40 MG/1
40 TABLET, DELAYED RELEASE ORAL DAILY
Qty: 60 TABLET | Refills: 0 | Status: SHIPPED | OUTPATIENT
Start: 2025-01-03

## 2025-01-03 NOTE — TELEPHONE ENCOUNTER
Prescription resent as requested.  Due to patient's severe symptoms, pantoprazole 40 mg twice daily was discussed with patient as per his current PCP    Thank you

## 2025-01-03 NOTE — TELEPHONE ENCOUNTER
Fax from [facility] pharmacy, insurance wants prescription to be once a day. Please send in new prescription for once a day.

## 2025-05-30 ENCOUNTER — OFFICE VISIT (OUTPATIENT)
Dept: URGENT CARE | Facility: PHYSICIAN GROUP | Age: 35
End: 2025-05-30
Payer: MEDICAID

## 2025-05-30 VITALS
SYSTOLIC BLOOD PRESSURE: 114 MMHG | TEMPERATURE: 98.6 F | RESPIRATION RATE: 18 BRPM | OXYGEN SATURATION: 97 % | BODY MASS INDEX: 34.07 KG/M2 | WEIGHT: 198.5 LBS | DIASTOLIC BLOOD PRESSURE: 78 MMHG | HEART RATE: 105 BPM

## 2025-05-30 DIAGNOSIS — U07.1 COVID-19: Primary | ICD-10-CM

## 2025-05-30 RX ORDER — OMEPRAZOLE 20 MG/1
20 CAPSULE, DELAYED RELEASE ORAL
COMMUNITY
Start: 2025-05-15

## 2025-05-30 RX ORDER — GUAIFENESIN 600 MG/1
600 TABLET, EXTENDED RELEASE ORAL EVERY 12 HOURS
Qty: 28 TABLET | Refills: 0 | Status: SHIPPED | OUTPATIENT
Start: 2025-05-30 | End: 2025-06-13

## 2025-05-30 RX ORDER — PREDNISONE 10 MG/1
20 TABLET ORAL DAILY
Qty: 6 TABLET | Refills: 0 | Status: SHIPPED | OUTPATIENT
Start: 2025-05-30 | End: 2025-06-02

## 2025-05-30 RX ORDER — ALBUTEROL SULFATE 90 UG/1
2 INHALANT RESPIRATORY (INHALATION) EVERY 6 HOURS PRN
Qty: 8.5 G | Refills: 0 | Status: SHIPPED | OUTPATIENT
Start: 2025-05-30

## 2025-05-30 ASSESSMENT — FIBROSIS 4 INDEX: FIB4 SCORE: 0.54

## 2025-05-30 NOTE — PROGRESS NOTES
Subjective:   Timmy Perez is a 35 y.o. male who presents for Cough (Tested pos for covid two days ago, burning in chest, green mucus, body aches, shortness of breath, X 3 days )      Patient is a 35-year-old male presenting clinic today reporting that he tested positive for COVID 2 days ago.  He states he has a burning sensation in his chest, coughing up phlegm, body aches, and feels shortness of breath and wheezing.  He states his fevers have resolved.  Denies any chest pain, palpitations, or residual headaches.  He has been taking over-the-counter DayQuil and NyQuil.  Patient is a chronic smoker.  Denies history of asthma or COPD.      Medications, Allergies, and current problem list reviewed today in Epic.     Objective:     /78   Pulse (!) 105   Temp 37 °C (98.6 °F) (Oral)   Resp 18   Wt 90 kg (198 lb 8 oz)   SpO2 97%     Physical Exam  Vitals reviewed.   Constitutional:       General: He is not in acute distress.     Appearance: Normal appearance. He is ill-appearing. He is not toxic-appearing or diaphoretic.   HENT:      Head: Normocephalic.      Right Ear: Tympanic membrane, ear canal and external ear normal.      Nose: Rhinorrhea present. No congestion.      Mouth/Throat:      Mouth: Mucous membranes are moist.      Pharynx: Posterior oropharyngeal erythema present. No oropharyngeal exudate.   Eyes:      Extraocular Movements: Extraocular movements intact.      Conjunctiva/sclera: Conjunctivae normal.      Pupils: Pupils are equal, round, and reactive to light.   Cardiovascular:      Rate and Rhythm: Normal rate.   Pulmonary:      Effort: Pulmonary effort is normal. No respiratory distress.      Breath sounds: No stridor. Wheezing present. No rhonchi or rales.   Chest:      Chest wall: No tenderness.   Musculoskeletal:         General: Normal range of motion.      Cervical back: Normal range of motion and neck supple. No rigidity or tenderness.   Lymphadenopathy:      Cervical: No cervical  adenopathy.   Skin:     General: Skin is warm and dry.   Neurological:      General: No focal deficit present.      Mental Status: He is alert and oriented to person, place, and time.   Psychiatric:         Mood and Affect: Mood normal.         Behavior: Behavior normal.         Assessment/Plan:     Diagnosis and associated orders:     1. COVID-19  albuterol 108 (90 Base) MCG/ACT Aero Soln inhalation aerosol    guaiFENesin ER (MUCINEX) 600 MG TABLET SR 12 HR    predniSONE (DELTASONE) 10 MG Tab         Comments/MDM:     This is an acute condition.  Patient is nontoxic-appearing in no acute distress.  He does appear mildly ill in clinic.  On auscultation of lung sounds patient does have diffuse wheezing throughout, negative for rhonchi or rales.  No signs of pneumonia at this time.  Patient does present with systemic symptoms seen through tachycardia, although vital signs within normal range she is speaking full sentences without any increased respiratory rate or effort.  Did discuss and offer Paxlovid however patient politely declined.  Patient started on albuterol, guaifenesin, and prednisone.  Side effects to all medications were discussed.  Stressed with patient the importance of pushing fluids as well as over-the-counter medications that may be taken to help treat COVID.  Discussed with patient ER precautions.  Work note provided.  Patient was involved with shared decision-making throughout the exam today and verbalizes understanding regards to plan of care, discharge instructions, and follow-up         Differential diagnosis, natural history, supportive care, and indications for immediate follow-up discussed.    Advised the patient to follow-up with the primary care physician for recheck, reevaluation, and consideration of further management.    I personally reviewed prior external notes and test results pertinent to today's visit as well as additional imaging and testing completed in clinic today.     Please  note that this dictation was created using voice recognition software. I have made a reasonable attempt to correct obvious errors, but I expect that there are errors of grammar and possibly content that I did not discover before finalizing the note.

## 2025-05-30 NOTE — LETTER
[facility] [place]  [address]  [facility] [id number]     May 30, 2025    Patient: Timmy Perez   YOB: 1990   Date of Visit: 5/30/2025       To Whom It May Concern:    Timmy Perez was seen and treated in our department on 5/30/2025.  He will need to be excused from school starting 5/28/2025 through 5/30/2025 due to illness.    Sincerely,     CYNDI Hernadez.